# Patient Record
Sex: FEMALE | Race: WHITE | Employment: OTHER | URBAN - NONMETROPOLITAN AREA
[De-identification: names, ages, dates, MRNs, and addresses within clinical notes are randomized per-mention and may not be internally consistent; named-entity substitution may affect disease eponyms.]

---

## 2017-11-03 LAB
BUN BLDV-MCNC: 37 MG/DL
CALCIUM SERPL-MCNC: 8.9 MG/DL
CHLORIDE BLD-SCNC: 104 MMOL/L
CO2: 20.9 MMOL/L
CREAT SERPL-MCNC: 1.8 MG/DL
GFR CALCULATED: 29
GLUCOSE BLD-MCNC: 95 MG/DL
POTASSIUM SERPL-SCNC: 5.3 MMOL/L
SODIUM BLD-SCNC: 140 MMOL/L

## 2017-12-08 ENCOUNTER — OFFICE VISIT (OUTPATIENT)
Dept: NEPHROLOGY | Age: 82
End: 2017-12-08
Payer: MEDICARE

## 2017-12-08 VITALS
OXYGEN SATURATION: 97 % | SYSTOLIC BLOOD PRESSURE: 133 MMHG | HEART RATE: 72 BPM | WEIGHT: 150.4 LBS | DIASTOLIC BLOOD PRESSURE: 64 MMHG

## 2017-12-08 DIAGNOSIS — N18.2 CHRONIC KIDNEY DISEASE, STAGE II (MILD): Primary | ICD-10-CM

## 2017-12-08 PROCEDURE — 4040F PNEUMOC VAC/ADMIN/RCVD: CPT | Performed by: INTERNAL MEDICINE

## 2017-12-08 PROCEDURE — 1090F PRES/ABSN URINE INCON ASSESS: CPT | Performed by: INTERNAL MEDICINE

## 2017-12-08 PROCEDURE — G8400 PT W/DXA NO RESULTS DOC: HCPCS | Performed by: INTERNAL MEDICINE

## 2017-12-08 PROCEDURE — G8427 DOCREV CUR MEDS BY ELIG CLIN: HCPCS | Performed by: INTERNAL MEDICINE

## 2017-12-08 PROCEDURE — 99213 OFFICE O/P EST LOW 20 MIN: CPT | Performed by: INTERNAL MEDICINE

## 2017-12-08 PROCEDURE — G8482 FLU IMMUNIZE ORDER/ADMIN: HCPCS | Performed by: INTERNAL MEDICINE

## 2017-12-08 PROCEDURE — G8421 BMI NOT CALCULATED: HCPCS | Performed by: INTERNAL MEDICINE

## 2017-12-08 PROCEDURE — 1123F ACP DISCUSS/DSCN MKR DOCD: CPT | Performed by: INTERNAL MEDICINE

## 2017-12-08 PROCEDURE — 1036F TOBACCO NON-USER: CPT | Performed by: INTERNAL MEDICINE

## 2017-12-08 ASSESSMENT — ENCOUNTER SYMPTOMS
RESPIRATORY NEGATIVE: 1
GASTROINTESTINAL NEGATIVE: 1

## 2017-12-08 NOTE — PROGRESS NOTES
Visit Date: 12/8/2017      HPI:     Stephanie Lee is a 80 y.o. female who presents today for:  Chief Complaint   Patient presents with    Chronic Kidney Disease     Stage IV    Hypertension       Current Outpatient Prescriptions   Medication Sig Dispense Refill    Multiple Vitamins-Minerals (OCUVITE PRESERVISION PO) Take by mouth daily       furosemide (LASIX) 40 MG tablet TAKE 1 TABLET DAILY 60 tablet 2    carvedilol (COREG) 25 MG tablet Take 25 mg by mouth 2 times daily       Coenzyme Q-10 100 MG CAPS Take 1 capsule by mouth daily.  vitamin B-12 (CYANOCOBALAMIN) 1000 MCG tablet Take 1,000 mcg by mouth daily.  amLODIPine (NORVASC) 10 MG tablet Take 10 mg by mouth daily.  allopurinol (ZYLOPRIM) 100 MG tablet Take 100 mg by mouth daily. No current facility-administered medications for this visit. No Known Allergies    Past Medical History:   Diagnosis Date    Anemia in chronic kidney disease(285.21)     Arthritis     Chronic kidney disease     Chronic kidney disease, stage II (mild)     Hyperlipidemia     Hypertension     Macular degeneration     Osteoarthritis     Other and unspecified hyperlipidemia     Unspecified essential hypertension       Past Surgical History:   Procedure Laterality Date    CATARACT REMOVAL      bilateral eyes    EYE SURGERY      EYE SURGERY      Cataract Removal    HYSTERECTOMY      TONSILLECTOMY       Family History   Problem Relation Age of Onset    Heart Disease Mother     Heart Disease Father     Heart Disease Brother     Heart Disease Brother      Social History   Substance Use Topics    Smoking status: Never Smoker    Smokeless tobacco: Never Used    Alcohol use Yes      Comment: occasionally        Subjective:      Review of Systems   Constitutional: Negative. HENT: Negative. Respiratory: Negative. Cardiovascular: Negative. Gastrointestinal: Negative. Genitourinary: Negative. Musculoskeletal: Negative.     Skin: Am facial edema   Neurological: Negative. Psychiatric/Behavioral: Negative. Objective:     /64 (Site: Right Arm, Position: Sitting)   Pulse 72   Wt 150 lb 6.4 oz (68.2 kg)   SpO2 97%     Physical Exam   Constitutional: She is oriented to person, place, and time. She appears well-developed and well-nourished. HENT:   Head: Normocephalic. Eyes: Right eye exhibits no discharge. Left eye exhibits no discharge. Neck: Normal range of motion. No JVD present. Cardiovascular: Normal rate, regular rhythm, normal heart sounds and intact distal pulses. Exam reveals no gallop and no friction rub. No murmur heard. Pulmonary/Chest: Effort normal and breath sounds normal. No respiratory distress. She has no wheezes. She has no rales. She exhibits no tenderness. Abdominal: Soft. Bowel sounds are normal. She exhibits no distension. There is no tenderness. Musculoskeletal: She exhibits no edema or tenderness. Lymphadenopathy:     She has no cervical adenopathy. Neurological: She is alert and oriented to person, place, and time. No cranial nerve deficit. Coordination normal.   Skin: Skin is warm and dry. No rash noted. No pallor. Psychiatric: She has a normal mood and affect. Her behavior is normal. Judgment and thought content normal.   Nursing note and vitals reviewed.     CBC:   Lab Results   Component Value Date    WBC 6.9 05/09/2013    HGB 11.1 (L) 05/09/2013    HCT 33.9 (L) 05/09/2013    MCV 95.9 05/09/2013     05/09/2013     BMP:    Lab Results   Component Value Date     11/03/2017     11/02/2016     10/15/2015    K 5.3 11/03/2017    K 4.6 11/02/2016    K 4.6 10/15/2015     11/03/2017     11/02/2016     10/15/2015    CO2 20.9 11/03/2017    CO2 23.3 11/02/2016    CO2 22.2 10/15/2015    BUN 37 11/03/2017    BUN 31 11/02/2016    BUN 32 10/15/2015    CREATININE 1.8 11/03/2017    CREATININE 1.4 11/02/2016    CREATININE 1.5 10/15/2015    GLUCOSE 95 11/03/2017    GLUCOSE 98 11/02/2016    GLUCOSE 98 10/15/2015   GFR 27   Hepatic: No results found for: AST, ALT, ALB, BILITOT, ALKPHOS  BNP: No results found for: BNP  Lipids: No results found for: CHOL, HDL  INR: No results found for: INR       URINE: No results found for: NAUR, PROTUR                          No results found for: NITRU, COLORU, PHUR, LABCAST, WBCUA, RBCUA, MUCUS, TRICHOMONAS, YEAST, BACTERIA, CLARITYU, SPECGRAV, LEUKOCYTESUR, UROBILINOGEN, BILIRUBINUR, BLOODU, GLUCOSEU, KETUA, AMORPHOUS      Microalbumen/Creatinine ratio:  No components found for: RUCREAT    1285  Assessment:   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

## 2017-12-15 LAB
BUN BLDV-MCNC: 40 MG/DL
CALCIUM SERPL-MCNC: 9.3 MG/DL
CHLORIDE BLD-SCNC: 108 MMOL/L
CO2: 20.9 MMOL/L
CREAT SERPL-MCNC: 1.8 MG/DL
CREATININE, URINE: 59
GFR CALCULATED: 29
GLUCOSE BLD-MCNC: 113 MG/DL
MICROALBUMIN/CREAT 24H UR: 155.4 MG/G{CREAT}
MICROALBUMIN/CREAT UR-RTO: 2634
POTASSIUM SERPL-SCNC: 3.8 MMOL/L
SODIUM BLD-SCNC: 145 MMOL/L

## 2018-01-01 ENCOUNTER — TELEPHONE (OUTPATIENT)
Dept: NEPHROLOGY | Age: 83
End: 2018-01-01

## 2018-01-01 ENCOUNTER — OFFICE VISIT (OUTPATIENT)
Dept: NEPHROLOGY | Age: 83
End: 2018-01-01
Payer: MEDICARE

## 2018-01-01 ENCOUNTER — TELEPHONE (OUTPATIENT)
Dept: CARDIOLOGY CLINIC | Age: 83
End: 2018-01-01

## 2018-01-01 VITALS
WEIGHT: 143 LBS | DIASTOLIC BLOOD PRESSURE: 60 MMHG | RESPIRATION RATE: 16 BRPM | SYSTOLIC BLOOD PRESSURE: 120 MMHG | HEART RATE: 68 BPM

## 2018-01-01 VITALS
HEART RATE: 63 BPM | OXYGEN SATURATION: 94 % | SYSTOLIC BLOOD PRESSURE: 141 MMHG | WEIGHT: 148.9 LBS | DIASTOLIC BLOOD PRESSURE: 71 MMHG

## 2018-01-01 VITALS
WEIGHT: 147.5 LBS | DIASTOLIC BLOOD PRESSURE: 70 MMHG | SYSTOLIC BLOOD PRESSURE: 138 MMHG | HEART RATE: 78 BPM | OXYGEN SATURATION: 99 %

## 2018-01-01 DIAGNOSIS — N18.4 ANEMIA IN STAGE 4 CHRONIC KIDNEY DISEASE (HCC): ICD-10-CM

## 2018-01-01 DIAGNOSIS — N18.2 CHRONIC KIDNEY DISEASE, STAGE II (MILD): Primary | ICD-10-CM

## 2018-01-01 DIAGNOSIS — R19.5 OCCULT BLOOD POSITIVE STOOL: Primary | ICD-10-CM

## 2018-01-01 DIAGNOSIS — R06.02 SHORTNESS OF BREATH: ICD-10-CM

## 2018-01-01 DIAGNOSIS — N17.9 ACUTE KIDNEY INJURY (HCC): Primary | ICD-10-CM

## 2018-01-01 DIAGNOSIS — E87.20 METABOLIC ACIDOSIS: ICD-10-CM

## 2018-01-01 DIAGNOSIS — D63.1 ANEMIA IN STAGE 4 CHRONIC KIDNEY DISEASE (HCC): ICD-10-CM

## 2018-01-01 DIAGNOSIS — R60.9 EDEMA, UNSPECIFIED TYPE: ICD-10-CM

## 2018-01-01 DIAGNOSIS — R79.89 ELEVATED SERUM CREATININE: Primary | ICD-10-CM

## 2018-01-01 LAB
BASOPHILS ABSOLUTE: ABNORMAL /ΜL
BASOPHILS RELATIVE PERCENT: ABNORMAL %
BUN BLDV-MCNC: 36 MG/DL
BUN BLDV-MCNC: 38 MG/DL
BUN BLDV-MCNC: 61 MG/DL
BUN BLDV-MCNC: 61 MG/DL
BUN BLDV-MCNC: 80 MG/DL
BUN BLDV-MCNC: 84 MG/DL
CALCIUM SERPL-MCNC: 8.5 MG/DL
CALCIUM SERPL-MCNC: 8.6 MG/DL
CALCIUM SERPL-MCNC: 8.6 MG/DL
CALCIUM SERPL-MCNC: 8.9 MG/DL
CALCIUM SERPL-MCNC: 8.9 MG/DL
CALCIUM SERPL-MCNC: 9 MG/DL
CHLORIDE BLD-SCNC: 105 MMOL/L
CHLORIDE BLD-SCNC: 105 MMOL/L
CHLORIDE BLD-SCNC: 112 MMOL/L
CHLORIDE BLD-SCNC: 115 MMOL/L
CHLORIDE BLD-SCNC: 116 MMOL/L
CHLORIDE BLD-SCNC: 116 MMOL/L
CO2: 15 MMOL/L
CO2: 17 MMOL/L
CO2: 21 MMOL/L
CO2: 22 MMOL/L
CREAT SERPL-MCNC: 1.6 MG/DL
CREAT SERPL-MCNC: 1.9 MG/DL
CREAT SERPL-MCNC: 2.3 MG/DL
CREAT SERPL-MCNC: 2.3 MG/DL
CREAT SERPL-MCNC: 2.6 MG/DL
CREAT SERPL-MCNC: 2.9 MG/DL
CREATININE, URINE: 34.7
CREATININE, URINE: 48.1
CREATININE, URINE: 48.4
EOSINOPHILS ABSOLUTE: ABNORMAL /ΜL
EOSINOPHILS RELATIVE PERCENT: ABNORMAL %
GFR CALCULATED: 16
GFR CALCULATED: 19
GFR CALCULATED: 21
GFR CALCULATED: 21
GFR CALCULATED: 27
GFR CALCULATED: 33
GLUCOSE BLD-MCNC: 122 MG/DL
GLUCOSE BLD-MCNC: 77 MG/DL
GLUCOSE BLD-MCNC: 84 MG/DL
GLUCOSE BLD-MCNC: 97 MG/DL
HCT VFR BLD CALC: 28.4 % (ref 36–46)
HCT VFR BLD CALC: 29.3 % (ref 36–46)
HCT VFR BLD CALC: 33.9 % (ref 36–46)
HEMOGLOBIN: 10.6 G/DL (ref 12–16)
HEMOGLOBIN: 9 G/DL (ref 12–16)
HEMOGLOBIN: 9.6 G/DL (ref 12–16)
IRON SATURATION: 14
IRON SATURATION: 32
IRON: 110
IRON: 47
LYMPHOCYTES ABSOLUTE: ABNORMAL /ΜL
LYMPHOCYTES RELATIVE PERCENT: ABNORMAL %
MCH RBC QN AUTO: ABNORMAL PG
MCHC RBC AUTO-ENTMCNC: ABNORMAL G/DL
MCV RBC AUTO: ABNORMAL FL
MICROALBUMIN/CREAT 24H UR: 24.3 MG/G{CREAT}
MICROALBUMIN/CREAT 24H UR: 41.2 MG/G{CREAT}
MICROALBUMIN/CREAT 24H UR: 87.3 MG/G{CREAT}
MICROALBUMIN/CREAT UR-RTO: 1815
MICROALBUMIN/CREAT UR-RTO: 700
MICROALBUMIN/CREAT UR-RTO: 851
MONOCYTES ABSOLUTE: ABNORMAL /ΜL
MONOCYTES RELATIVE PERCENT: ABNORMAL %
NEUTROPHILS ABSOLUTE: ABNORMAL /ΜL
NEUTROPHILS RELATIVE PERCENT: ABNORMAL %
PLATELET # BLD: 196 K/ΜL
PLATELET # BLD: 282 K/ΜL
PLATELET # BLD: 328 K/ΜL
PMV BLD AUTO: ABNORMAL FL
POTASSIUM SERPL-SCNC: 4.8 MMOL/L
POTASSIUM SERPL-SCNC: 4.9 MMOL/L
POTASSIUM SERPL-SCNC: 5.1 MMOL/L
RBC # BLD: 3.16 10^6/ΜL
RBC # BLD: 3.17 10^6/ΜL
RBC # BLD: 3.63 10^6/ΜL
RETICULOCYTE ABSOLUTE COUNT: NORMAL
RETICULOCYTE COUNT PCT: 1.1
SODIUM BLD-SCNC: 142 MMOL/L
SODIUM BLD-SCNC: 142 MMOL/L
SODIUM BLD-SCNC: 145 MMOL/L
SODIUM BLD-SCNC: 149 MMOL/L
TOTAL IRON BINDING CAPACITY: 340
TOTAL IRON BINDING CAPACITY: 345
URIC ACID: 4.6
WBC # BLD: 5.2 10^3/ML
WBC # BLD: 5.8 10^3/ML
WBC # BLD: 6.2 10^3/ML

## 2018-01-01 PROCEDURE — G8421 BMI NOT CALCULATED: HCPCS | Performed by: INTERNAL MEDICINE

## 2018-01-01 PROCEDURE — 1123F ACP DISCUSS/DSCN MKR DOCD: CPT | Performed by: INTERNAL MEDICINE

## 2018-01-01 PROCEDURE — 99213 OFFICE O/P EST LOW 20 MIN: CPT | Performed by: INTERNAL MEDICINE

## 2018-01-01 PROCEDURE — 99214 OFFICE O/P EST MOD 30 MIN: CPT | Performed by: INTERNAL MEDICINE

## 2018-01-01 PROCEDURE — 1101F PT FALLS ASSESS-DOCD LE1/YR: CPT | Performed by: INTERNAL MEDICINE

## 2018-01-01 PROCEDURE — 4040F PNEUMOC VAC/ADMIN/RCVD: CPT | Performed by: INTERNAL MEDICINE

## 2018-01-01 PROCEDURE — 1090F PRES/ABSN URINE INCON ASSESS: CPT | Performed by: INTERNAL MEDICINE

## 2018-01-01 PROCEDURE — 1036F TOBACCO NON-USER: CPT | Performed by: INTERNAL MEDICINE

## 2018-01-01 PROCEDURE — G8484 FLU IMMUNIZE NO ADMIN: HCPCS | Performed by: INTERNAL MEDICINE

## 2018-01-01 PROCEDURE — G8400 PT W/DXA NO RESULTS DOC: HCPCS | Performed by: INTERNAL MEDICINE

## 2018-01-01 PROCEDURE — G8427 DOCREV CUR MEDS BY ELIG CLIN: HCPCS | Performed by: INTERNAL MEDICINE

## 2018-01-01 PROCEDURE — G8482 FLU IMMUNIZE ORDER/ADMIN: HCPCS | Performed by: INTERNAL MEDICINE

## 2018-01-01 RX ORDER — SODIUM BICARBONATE 650 MG/1
650 TABLET ORAL 2 TIMES DAILY
Qty: 60 TABLET | Refills: 2 | Status: SHIPPED | OUTPATIENT
Start: 2018-01-01 | End: 2019-12-14

## 2018-01-01 RX ORDER — LANOLIN ALCOHOL/MO/W.PET/CERES
325 CREAM (GRAM) TOPICAL 2 TIMES DAILY
Qty: 90 TABLET | Refills: 3 | Status: SHIPPED | OUTPATIENT
Start: 2018-01-01 | End: 2018-01-01 | Stop reason: SDUPTHER

## 2018-01-01 ASSESSMENT — ENCOUNTER SYMPTOMS
SORE THROAT: 0
DIARRHEA: 0
BLOOD IN STOOL: 0
VOMITING: 0
COUGH: 0
RESPIRATORY NEGATIVE: 1
FACIAL SWELLING: 0
CONSTIPATION: 0
COLOR CHANGE: 0
SHORTNESS OF BREATH: 0
GASTROINTESTINAL NEGATIVE: 1
WHEEZING: 0
GASTROINTESTINAL NEGATIVE: 1
ABDOMINAL DISTENTION: 0
BACK PAIN: 0
ABDOMINAL PAIN: 0
CHEST TIGHTNESS: 0
NAUSEA: 0
RESPIRATORY NEGATIVE: 1

## 2018-03-07 NOTE — PROGRESS NOTES
Neurological: She is alert and oriented to person, place, and time. No cranial nerve deficit. Coordination normal.   Skin: Skin is warm and dry. No rash noted. No pallor. Psychiatric: She has a normal mood and affect. Her behavior is normal. Judgment and thought content normal.   Nursing note and vitals reviewed.     CBC:   Lab Results   Component Value Date    WBC 5.2 03/01/2018    HGB 10.6 (A) 03/01/2018    HCT 33.9 (A) 03/01/2018    MCV 95.9 05/09/2013     03/01/2018     BMP:    Lab Results   Component Value Date     03/01/2018     12/15/2017     11/03/2017    K 4.8 03/01/2018    K 3.8 12/15/2017    K 5.3 11/03/2017     03/01/2018     12/15/2017     11/03/2017    CO2 22.0 03/01/2018    CO2 20.9 12/15/2017    CO2 20.9 11/03/2017    BUN 36 03/01/2018    BUN 40 12/15/2017    BUN 37 11/03/2017    CREATININE 1.6 03/01/2018    CREATININE 1.8 12/15/2017    CREATININE 1.8 11/03/2017    GLUCOSE 122 03/01/2018    GLUCOSE 113 12/15/2017    GLUCOSE 95 11/03/2017      Hepatic: No results found for: AST, ALT, ALB, BILITOT, ALKPHOS  BNP: No results found for: BNP  Lipids: No results found for: CHOL, HDL  INR: No results found for: INR       URINE: No results found for: NAUR, PROTUR                          No results found for: NITRU, COLORU, PHUR, LABCAST, WBCUA, RBCUA, MUCUS, TRICHOMONAS, YEAST, BACTERIA, CLARITYU, SPECGRAV, LEUKOCYTESUR, UROBILINOGEN, BILIRUBINUR, BLOODU, GLUCOSEU, KETUA, AMORPHOUS      Microalbumen/Creatinine ratio:  No components found for: RUCREAT  1760(1790)      Assessment:   Chronic kidney disease stage 3 stable  With nephrosis and elevated K?L light chains  Hypertension controlled              Plan:   SPIEP  Fu 3 months  Serial labs  Discussed withpatient      Cindy Piper, DO

## 2018-12-10 NOTE — TELEPHONE ENCOUNTER
I called the patient and she saws she does not feel well at all. No new medications and she states that she is drinking plenty of water. I asked her if she thought she needed to fo to hospital and she said no that she would wait until she sees you on Friday. Will mail lab for her to do on Thursday.

## 2018-12-14 PROBLEM — N17.9 ACUTE KIDNEY INJURY (HCC): Status: ACTIVE | Noted: 2018-01-01

## 2018-12-14 PROBLEM — R06.02 SHORTNESS OF BREATH: Status: ACTIVE | Noted: 2018-01-01

## 2018-12-14 PROBLEM — R60.9 EDEMA: Status: ACTIVE | Noted: 2018-01-01

## 2018-12-17 NOTE — TELEPHONE ENCOUNTER
----- Message from Johnson Vazquez DO sent at 12/17/2018  2:53 PM EST -----  Were we ever able to get in touch with patient to see how she's doing? I reviewed her labs from [facility] today. Creatinine improved 2.3 from 2.9. Still very acidotic - increase her sodium bicarbonate to 2 pills twice daily (1300 mg BID). Keep off Lasix. Repeat BMP on Friday.   If not feeling any better though or if swelling is worse then tell her to go to the hospital.  Thanks